# Patient Record
Sex: MALE | Race: WHITE | Employment: UNEMPLOYED | URBAN - METROPOLITAN AREA
[De-identification: names, ages, dates, MRNs, and addresses within clinical notes are randomized per-mention and may not be internally consistent; named-entity substitution may affect disease eponyms.]

---

## 2018-11-20 ENCOUNTER — NURSE ONLY (OUTPATIENT)
Dept: CARDIOLOGY CLINIC | Age: 83
End: 2018-11-20
Payer: COMMERCIAL

## 2018-11-20 ENCOUNTER — OFFICE VISIT (OUTPATIENT)
Dept: CARDIOLOGY CLINIC | Age: 83
End: 2018-11-20
Payer: COMMERCIAL

## 2018-11-20 VITALS
SYSTOLIC BLOOD PRESSURE: 100 MMHG | DIASTOLIC BLOOD PRESSURE: 60 MMHG | HEART RATE: 60 BPM | HEIGHT: 67 IN | WEIGHT: 205 LBS | BODY MASS INDEX: 32.18 KG/M2

## 2018-11-20 DIAGNOSIS — N18.9 CHRONIC KIDNEY DISEASE, UNSPECIFIED CKD STAGE: ICD-10-CM

## 2018-11-20 DIAGNOSIS — I48.20 CHRONIC A-FIB (HCC): ICD-10-CM

## 2018-11-20 DIAGNOSIS — Z95.0 PACEMAKER: Primary | ICD-10-CM

## 2018-11-20 DIAGNOSIS — I25.10 CORONARY ARTERY DISEASE INVOLVING NATIVE CORONARY ARTERY OF NATIVE HEART WITHOUT ANGINA PECTORIS: Primary | ICD-10-CM

## 2018-11-20 DIAGNOSIS — I44.7 LBBB (LEFT BUNDLE BRANCH BLOCK): ICD-10-CM

## 2018-11-20 DIAGNOSIS — I73.9 PVD (PERIPHERAL VASCULAR DISEASE) WITH CLAUDICATION (HCC): ICD-10-CM

## 2018-11-20 DIAGNOSIS — Z95.0 STATUS POST PLACEMENT OF CARDIAC PACEMAKER: ICD-10-CM

## 2018-11-20 PROCEDURE — 93000 ELECTROCARDIOGRAM COMPLETE: CPT | Performed by: INTERNAL MEDICINE

## 2018-11-20 PROCEDURE — 93279 PRGRMG DEV EVAL PM/LDLS PM: CPT | Performed by: INTERNAL MEDICINE

## 2018-11-20 PROCEDURE — 99214 OFFICE O/P EST MOD 30 MIN: CPT | Performed by: INTERNAL MEDICINE

## 2019-05-14 ENCOUNTER — NURSE ONLY (OUTPATIENT)
Dept: CARDIOLOGY CLINIC | Age: 84
End: 2019-05-14

## 2019-05-14 ENCOUNTER — OFFICE VISIT (OUTPATIENT)
Dept: CARDIOLOGY CLINIC | Age: 84
End: 2019-05-14

## 2019-05-14 VITALS
HEART RATE: 64 BPM | SYSTOLIC BLOOD PRESSURE: 100 MMHG | BODY MASS INDEX: 32.33 KG/M2 | DIASTOLIC BLOOD PRESSURE: 60 MMHG | WEIGHT: 206 LBS | HEIGHT: 67 IN

## 2019-05-14 DIAGNOSIS — I73.9 PVD (PERIPHERAL VASCULAR DISEASE) WITH CLAUDICATION (HCC): ICD-10-CM

## 2019-05-14 DIAGNOSIS — I48.20 CHRONIC ATRIAL FIBRILLATION (HCC): ICD-10-CM

## 2019-05-14 DIAGNOSIS — I25.10 CORONARY ARTERY DISEASE INVOLVING NATIVE CORONARY ARTERY OF NATIVE HEART WITHOUT ANGINA PECTORIS: Primary | ICD-10-CM

## 2019-05-14 DIAGNOSIS — I44.7 LBBB (LEFT BUNDLE BRANCH BLOCK): ICD-10-CM

## 2019-05-14 DIAGNOSIS — Z95.0 PACEMAKER: Primary | ICD-10-CM

## 2019-05-14 DIAGNOSIS — Z95.0 STATUS POST PLACEMENT OF CARDIAC PACEMAKER: ICD-10-CM

## 2019-05-14 PROCEDURE — 93279 PRGRMG DEV EVAL PM/LDLS PM: CPT | Performed by: INTERNAL MEDICINE

## 2019-05-14 PROCEDURE — 99214 OFFICE O/P EST MOD 30 MIN: CPT | Performed by: INTERNAL MEDICINE

## 2019-05-14 NOTE — PROGRESS NOTES
History:   Diagnosis Date    Arthritis     both knees    Atrial fibrillation (HCC)     tachy-braden syndrome with more than 4 second pauses     CAD (coronary artery disease)     Chronic kidney disease     Diabetes mellitus ([facility]     diagnosed in 2002    Gout     H/O cardiovascular stress test 8/1/11    Adenosine: Nontransmural MI involving inferior wall of LV extending into inferoseptal wall and small portion of apical lateral wall with possibly small area of residual stress-induced ischemia involving a very small portion of lapical lateral wall. Gated views showed paradoxical septal motion, hypokinesis of IVS, dyskinesis of small portion of apical septum, preserved global LVSF, EF 51%     H/O Doppler echocardiogram 8/15/08    Normal LV size with borderline concentric LVH, regional wall motion abnormality, EF 45%, evidence of diastolic dysfunction, mildly dilated left atrium, mildly sclerotic valve without stenosis or insufficiency, mod posterior mitral annular calcifications without mitral stenosis, trace MR.  Pacemaker lead noted in right side of heart    Hyperlipidemia     LBBB (left bundle branch block)     MI (myocardial infarction) ([facility] 4/26/98    Non-Q wave     Pacemaker at end of battery life 07/2016    PVD (peripheral vascular disease) ([facility]     Arterial ultrasound of left lower extremity reported likely occluded left posterior tibial artery     SOB (shortness of breath) on exertion             Past Surgical History:   Procedure Laterality Date    ARTERIAL ANEURYSM REPAIR  6/98    S/P right femoral pseudoaneurysm repair status post cardiac cath   [facility] CARDIAC PACEMAKER PLACEMENT      CATARACT REMOVAL Bilateral 7/08    COLONOSCOPY  1987    diverticulosis    CYSTOSCOPY  1992    renal calculi    DIAGNOSTIC CARDIAC CATH LAB PROCEDURE  10/30/97    [address]: CAD    DIAGNOSTIC CARDIAC CATH LAB PROCEDURE  4/28/98    [address]: Cardiac cath, balloon angioplasty and stent deployment to prox LAD, balloon angioplasty distal left circumflex. Moderate to severe LV systolic dysfunction,  EF 35-40%     DIAGNOSTIC CARDIAC CATH LAB PROCEDURE  4/19/99    [address]: Mildly dilated LV. Small area of anterolateral hypokinesis. EF 40-50% apical LAD disease. Prox LAD site of stent widely patent. Subtotally occluded middle 3rd of left CX (cuplrit vessel). Subtotally occluded 2nd OM branch. Diffuse up to 40-50% mid RCA disease and up to 40-50% distal RCA disease. Occluded PDA of RCA.  Successful balloon angioplasty and stent deploy to middle 3rd of left CX     JOINT REPLACEMENT Left     knee    JOINT REPLACEMENT Right 1998, and again 3/14/05    knee   Arvil Fitch PACEMAKER PLACEMENT  07/26/2016    [place] K172 SR (#[id number]) Leads from 05/21/2007 model 4136 #[id number]    PTCA  10/31/97    to LAD and RCA          Family History   Problem Relation Age of Onset    Diabetes Mother     Stroke Father     Diabetes Sister     Heart Surgery Brother     Heart Disease Brother     Diabetes Brother     Heart Failure Sister     Heart Disease Brother           Social History     Tobacco Use    Smoking status: Never Smoker    Smokeless tobacco: Never Used   Substance Use Topics    Alcohol use: No     Comment: occasional coffee or pop         Review of Systems:  HEENT: negative for acute visual symptoms or auditory problems, no dysphagia  Constitutional: negative for fever and chills, or significant weight loss  Respiratory: negative for cough, wheezing, or hemoptysis  Cardiovascular: negative for chest pain, palpitations, and dyspnea  Gastrointestinal: negative for abdominal pain, diarrhea, nausea and vomiting  Endocrine: Negative for polyuria and polydyspsia  Genitourinary:negative for dysuria and hematuria  Derm: negative for rash and skin lesion(s)  Neurological: negative for tingling, numbness, weakness, seizures and tremors  Endocrine: negative for polydipsia and polyuria  Musculoskeletal: negative for pain or tenderness  Psychiatric: negative for anxiety, depression, or suicidal ideations         O:  COMPLETE PHYSICAL EXAM:       /60   Pulse 64   Ht 5' 7\" (1.702 m)   Wt 206 lb (93.4 kg)   BMI 32.26 kg/m²       General:   Patient alert, comfortable, no distress. Appears stated age. HEENT:    Pupils equal, no icterus, no nasal drainage, tongue moist.   Neck:              No masses, Thyroid not palpable. Chest:   Normal configuration, non tender. Lungs:   Clear to auscultation bilaterally, few scattered rhonchi. Cardiovascular:  Regular rhythm, 1/6 systolic murmur, No S3, PMI at 5th ICS, no palpable thrills, No elevated JVD, No carotid bruit. Abdomen:  Soft, Non tender, Bowel sounds normal, no pulsatile abdominal aorta, no palpable masses. Extremities:  + edema. Distal pulses palpable. No cyanosis, no clubbing. Skin:   Good turgor, warm and dry, no cyanosis. Musculoskeletal: No joint swelling or deformity. Neuro:   Cranial nerves grossly intact; No focal neurologic deficit. Psych:   Alert, good mood and effect. REVIEW OF DIAGNOSTIC TESTS:        Electrocardiogram: Reviewed       Pacemaker:  Interrogated, OK           A/P:   ASSESSMENT / PLAN:    Pam Ambriz was seen today for coronary artery disease. Diagnoses and all orders for this visit:       Coronary artery disease involving native coronary artery of native heart without angina pectoris; s/p [address] [place] [address];  On BB, ARB, Statins, No ASA due to Twyla Negus  [facility] 12 Lead     Chronic atrial fibrillation [facility]): Resume renal dose Xarelto 15mg daily  -     EKG 12 Lead     Status post placement of cardiac pacemaker: VVI- 5/2007; s/p PGR 7/2016-[employer]; normal Fn     LBBB (left bundle branch block)     Chronic kidney disease, unspecified CKD stage     PAD (peripheral artery disease) ([facility] [address] stable     Chronic kidney disease, unspecified CKD stage     Preventive Cardiology: Low cholesterol diet, regular exercise as tolerate, and gradual weight loss discussed. Monitor BP and heart rates. D/w his wife   All questions answered about cardiac diagnoses and cardiac medications. Continue current medications. Compliance with medications and f/u with all physicians discussed. Risk factor modification based on risk profile discussed. Call if any exertional chest pain, short of breath, dizzy or palpitations   Follow up in 12 months or earlier if needed.          [facility]  [address], [place], [address]  [phone]

## 2019-10-08 ENCOUNTER — TELEPHONE (OUTPATIENT)
Dept: ADMINISTRATIVE | Age: 84
End: 2019-10-08

## 2019-11-22 ENCOUNTER — NURSE ONLY (OUTPATIENT)
Dept: CARDIOLOGY CLINIC | Age: 84
End: 2019-11-22
Payer: MEDICARE

## 2019-11-22 DIAGNOSIS — I48.20 CHRONIC A-FIB (HCC): ICD-10-CM

## 2019-11-22 DIAGNOSIS — Z95.0 PACEMAKER: Primary | ICD-10-CM

## 2019-11-22 PROCEDURE — 93294 REM INTERROG EVL PM/LDLS PM: CPT | Performed by: INTERNAL MEDICINE

## 2019-11-22 PROCEDURE — 93296 REM INTERROG EVL PM/IDS: CPT | Performed by: INTERNAL MEDICINE

## 2020-06-15 ENCOUNTER — TELEPHONE (OUTPATIENT)
Dept: CARDIOLOGY CLINIC | Age: 85
End: 2020-06-15

## 2020-06-23 ENCOUNTER — TELEPHONE (OUTPATIENT)
Dept: ADMINISTRATIVE | Age: 85
End: 2020-06-23

## 2020-06-23 NOTE — TELEPHONE ENCOUNTER
Pt called in, received letter to schedule appt w/ Dr Salvatore Leonardo.   Please advise pt at [phone]

## 2020-08-28 ENCOUNTER — OFFICE VISIT (OUTPATIENT)
Dept: CARDIOLOGY CLINIC | Age: 85
End: 2020-08-28
Payer: MEDICARE

## 2020-08-28 ENCOUNTER — NURSE ONLY (OUTPATIENT)
Dept: CARDIOLOGY CLINIC | Age: 85
End: 2020-08-28
Payer: MEDICARE

## 2020-08-28 VITALS
SYSTOLIC BLOOD PRESSURE: 90 MMHG | DIASTOLIC BLOOD PRESSURE: 58 MMHG | BODY MASS INDEX: 29.82 KG/M2 | RESPIRATION RATE: 16 BRPM | HEIGHT: 67 IN | WEIGHT: 190 LBS | HEART RATE: 64 BPM

## 2020-08-28 PROCEDURE — 93279 PRGRMG DEV EVAL PM/LDLS PM: CPT | Performed by: INTERNAL MEDICINE

## 2020-08-28 PROCEDURE — 93000 ELECTROCARDIOGRAM COMPLETE: CPT | Performed by: INTERNAL MEDICINE

## 2020-08-28 PROCEDURE — 99213 OFFICE O/P EST LOW 20 MIN: CPT | Performed by: INTERNAL MEDICINE

## 2020-08-28 RX ORDER — ACETAMINOPHEN 160 MG
TABLET,DISINTEGRATING ORAL DAILY
COMMUNITY

## 2020-08-28 NOTE — PROGRESS NOTES
polydipsia and polyuria  Musculoskeletal: negative for pain or tenderness  Psychiatric: negative for anxiety, depression, or suicidal ideations         O:  COMPLETE PHYSICAL EXAM:       BP (!) 90/58   Pulse 64   Resp 16   Ht 5' 7\" (1.702 m)   Wt 190 lb (86.2 kg)   BMI 29.76 kg/m²       General:   Patient alert, comfortable, no distress. Appears stated age. HEENT:    Pupils equal, no icterus, no nasal drainage, tongue moist.   Neck:              No masses, Thyroid not palpable. No elevated JVD, No carotid bruit. Chest:   Normal configuration, non tender. Lungs:   Clear to auscultation bilaterally, few scattered rhonchi. Cardiovascular:  Regular rhythm, 1/6 systolic murmur, No S3, PMI at 5th ICS, no palpable thrills,    Abdomen:  Soft, Non tender, Bowel sounds normal, no pulsatile abdominal aorta, no palpable masses. Extremities:  No edema. Distal pulses palpable. No cyanosis, no clubbing. Skin:   Good turgor, warm and dry, no cyanosis. Musculoskeletal: No joint swelling or deformity. Neuro:   Cranial nerves grossly intact; No focal neurologic deficit. Psych:   Alert, good mood and effect. REVIEW OF DIAGNOSTIC TESTS:        Electrocardiogram: A fib, Ventricular paced      Pacemaker:  Interrogated, OK             A/P:   ASSESSMENT / PLAN:    Avis Mcardle was seen today for coronary artery disease. Diagnoses and all orders for this visit:      Coronary artery disease involving native coronary artery of native heart without angina pectoris; s/p [address] [place] [address];  On BB, ARB, Statin, No ASA due to [facility] 12 Lead     Chronic atrial fibrillation (HCC): On Xarelto, adjust for renal Fn  -     EKG 12 Lead     Status post placement of cardiac pacemaker: VVI- 5/2007; s/p PGR 7/2016-[employer]; normal Fn     LBBB (left bundle branch block)     PAD (peripheral artery disease) ([facility] [address] stable     Chronic kidney disease, unspecified CKD stage     Preventive Cardiology: Low cholesterol diet, regular exercise as tolerate discussed. Monitor BP and heart rates. Above recommendations discussed with him and his wife   All questions answered about cardiac diagnoses and cardiac medications. Continue current medications. Compliance with medications and f/u with all physicians discussed. Risk factor modification based on risk profile discussed. Call if any exertional chest pain, short of breath, dizzy or palpitations   Follow up in 6 months or earlier if needed.          [facility] Card[facility]  [address], [place], [facility]  [phone]

## 2021-03-05 ENCOUNTER — NURSE ONLY (OUTPATIENT)
Dept: CARDIOLOGY CLINIC | Age: 86
End: 2021-03-05
Payer: MEDICARE

## 2021-03-05 ENCOUNTER — OFFICE VISIT (OUTPATIENT)
Dept: CARDIOLOGY CLINIC | Age: 86
End: 2021-03-05
Payer: MEDICARE

## 2021-03-05 VITALS
SYSTOLIC BLOOD PRESSURE: 98 MMHG | HEART RATE: 60 BPM | WEIGHT: 190 LBS | HEIGHT: 67 IN | BODY MASS INDEX: 29.82 KG/M2 | DIASTOLIC BLOOD PRESSURE: 64 MMHG

## 2021-03-05 DIAGNOSIS — I73.9 PAD (PERIPHERAL ARTERY DISEASE) (HCC): ICD-10-CM

## 2021-03-05 DIAGNOSIS — Z95.0 STATUS POST PLACEMENT OF CARDIAC PACEMAKER: ICD-10-CM

## 2021-03-05 DIAGNOSIS — I48.20 CHRONIC A-FIB (HCC): Primary | ICD-10-CM

## 2021-03-05 DIAGNOSIS — I25.10 CORONARY ARTERY DISEASE INVOLVING NATIVE CORONARY ARTERY OF NATIVE HEART WITHOUT ANGINA PECTORIS: ICD-10-CM

## 2021-03-05 DIAGNOSIS — Z95.0 PACEMAKER: ICD-10-CM

## 2021-03-05 DIAGNOSIS — I44.7 LBBB (LEFT BUNDLE BRANCH BLOCK): ICD-10-CM

## 2021-03-05 PROCEDURE — 99214 OFFICE O/P EST MOD 30 MIN: CPT | Performed by: INTERNAL MEDICINE

## 2021-03-05 PROCEDURE — 93279 PRGRMG DEV EVAL PM/LDLS PM: CPT | Performed by: INTERNAL MEDICINE

## 2021-03-05 PROCEDURE — 93000 ELECTROCARDIOGRAM COMPLETE: CPT | Performed by: INTERNAL MEDICINE

## 2021-03-05 NOTE — PROGRESS NOTES
OFFICE VISIT     PRIMARY CARE PHYSICIAN:      Zay Rascon MD       ALLERGIES / SENSITIVITIES:      No Known Allergies       REVIEWED MEDICATIONS:        Current Outpatient Medications:     Cholecalciferol (VITAMIN D3) 50 MCG (2000 UT) CAPS, Take by mouth daily, Disp: , Rfl:     insulin glargine (LANTUS SOLOSTAR) 100 UNIT/ML injection, Inject into the skin 2 times daily 44 units am and 24 units pm, Disp: , Rfl:     rivaroxaban (XARELTO) 15 MG TABS tablet, Take 1 tablet by mouth daily, Disp: 30 tablet, Rfl: 9    donepezil (ARICEPT) 5 MG tablet, Take 5 mg by mouth nightly, Disp: , Rfl:     torsemide (DEMADEX) 10 MG tablet, Take 1 tablet by mouth every morning (Patient taking differently: Take 100 mg by mouth every morning ), Disp: 30 tablet, Rfl: 1    insulin aspart (NOVOLOG) 100 UNIT/ML injection vial, Inject into the skin 3 times daily (before meals) 5 units before meals, Disp: , Rfl:     metoprolol (LOPRESSOR) 25 MG tablet, Take 25 mg by mouth 2 times daily Take am dos, 07/26., Disp: , Rfl:     atorvastatin (LIPITOR) 80 MG tablet, Take 40 mg by mouth daily , Disp: , Rfl:     losartan (COZAAR) 100 MG tablet, Take 100 mg by mouth daily. , Disp: , Rfl:     amLODIPine (NORVASC) 5 MG tablet, Take 5 mg by mouth daily Take am dos, 07/26., Disp: , Rfl:     nitroGLYCERIN (NITROSTAT) 0.4 MG SL tablet, Place 0.4 mg under the tongue every 5 minutes as needed Last use 2 years ago, Disp: , Rfl:     allopurinol (ZYLOPRIM) 100 MG tablet, Take 100 mg by mouth daily. , Disp: , Rfl:       S: REASON FOR VISIT:       Chief Complaint   Patient presents with    Atrial Fibrillation     6 month ov  Patient denies any cp sob or dizziness.      Coronary Artery Disease          History of Present Illness:       Office Visit for follow up of CAD, A fib, pacer   80 yr old Margo Rodriguez with history of PAF, CAD, Pacemaker came for f/u visit   No hospitalizations or surgeries since last visit   Patient is compliant with all medications   Deejay any exertional chest pain or short of breath   No palpitations, dizzy or syncope. Active at home   No orthopnea   Try to watch diet          Past Medical History:   Diagnosis Date    Arthritis     both knees    Atrial fibrillation (HCC)     tachy-braden syndrome with more than 4 second pauses     CAD (coronary artery disease)     Chronic kidney disease     Diabetes mellitus ([facility] [address]     diagnosed in 2002    Gout     H/O cardiovascular stress test 8/1/11    Adenosine: Nontransmural MI involving inferior wall of LV extending into inferoseptal wall and small portion of apical lateral wall with possibly small area of residual stress-induced ischemia involving a very small portion of lapical lateral wall. Gated views showed paradoxical septal motion, hypokinesis of IVS, dyskinesis of small portion of apical septum, preserved global LVSF, EF 51%     H/O Doppler echocardiogram 8/15/08    Normal LV size with borderline concentric LVH, regional wall motion abnormality, EF 45%, evidence of diastolic dysfunction, mildly dilated left atrium, mildly sclerotic valve without stenosis or insufficiency, mod posterior mitral annular calcifications without mitral stenosis, trace MR.  Pacemaker lead noted in right side of heart    Hyperlipidemia     LBBB (left bundle branch block)     MI (myocardial infarction) ([address] 4/26/98    Non-Q wave     Pacemaker at end of battery life 07/2016    PVD (peripheral vascular disease) ([facility] [address]     Arterial ultrasound of left lower extremity reported likely occluded left posterior tibial artery     SOB (shortness of breath) on exertion             Past Surgical History:   Procedure Laterality Date    ARTERIAL ANEURYSM REPAIR  6/98    S/P right femoral pseudoaneurysm repair status post cardiac cath   Margaret Fay CARDIAC PACEMAKER PLACEMENT      CATARACT REMOVAL Bilateral 7/08    COLONOSCOPY  1987    diverticulosis    CYSTOSCOPY  1992    renal calculi    DIAGNOSTIC CARDIAC CATH LAB PROCEDURE  10/30/97    [address]: CAD    DIAGNOSTIC CARDIAC CATH LAB PROCEDURE  4/28/98    [address]: Cardiac cath, balloon angioplasty and stent deployment to prox LAD, balloon angioplasty distal left circumflex. Moderate to severe LV systolic dysfunction,  EF 35-40%     DIAGNOSTIC CARDIAC CATH LAB PROCEDURE  4/19/99    [address]: Mildly dilated LV. Small area of anterolateral hypokinesis. EF 40-50% apical LAD disease. Prox LAD site of stent widely patent. Subtotally occluded middle 3rd of left CX (cuplrit vessel). Subtotally occluded 2nd OM branch. Diffuse up to 40-50% mid RCA disease and up to 40-50% distal RCA disease. Occluded PDA of RCA.  Successful balloon angioplasty and stent deploy to middle 3rd of left CX     JOINT REPLACEMENT Left     knee    JOINT REPLACEMENT Right 1998, and again 3/14/05    knee   Leslie Shiv PACEMAKER PLACEMENT  07/26/2016    [place] K172 SR (#[id number]) Leads from 05/21/2007 model 4136 #[id number]    PTCA  10/31/97    to LAD and RCA          Family History   Problem Relation Age of Onset    Diabetes Mother     Stroke Father     Diabetes Sister     Heart Surgery Brother     Heart Disease Brother     Diabetes Brother     Heart Failure Sister     Heart Disease Brother           Social History     Tobacco Use    Smoking status: Never Smoker    Smokeless tobacco: Never Used   Substance Use Topics    Alcohol use: No     Comment: occasional coffee or pop         Review of Systems:  Constitutional: negative for fever and chills, or significant weight loss  HEENT: negative for acute visual symptoms or auditory problems, no dysphagia  Respiratory: negative for cough, wheezing, or hemoptysis  Cardiovascular: negative for chest pain, palpitations, and dyspnea  Gastrointestinal: negative for abdominal pain, diarrhea, nausea and vomiting  Endocrine: Negative for polyuria and polydyspsia  Genitourinary:negative for dysuria and hematuria  Derm: negative for rash and skin lesion(s)  Neurological: negative for tingling, numbness, weakness, seizures and tremors  Endocrine: negative for polydipsia and polyuria  Musculoskeletal: negative for pain or tenderness  Psychiatric: negative for anxiety, depression, or suicidal ideations         O:  COMPLETE PHYSICAL EXAM:       BP 98/64 (Site: Left Upper Arm, Position: Sitting, Cuff Size: Medium Adult)   Pulse 60   Ht 5' 7\" (1.702 m)   Wt 190 lb (86.2 kg)   BMI 29.76 kg/m²       General:   Patient alert, comfortable, no distress. Appears stated age. HEENT:    Pupils equal, no icterus, no nasal drainage, tongue moist.   Neck:              No masses, Thyroid not palpable. No elevated JVD, No carotid bruit. Chest:   Normal configuration, non tender. Lungs:   Clear to auscultation bilaterally, few scattered rhonchi. Cardiovascular:  Regular rhythm, 1/6 systolic murmur, No S3,  no palpable thrills,    Abdomen:  Soft, Non tender, Bowel sounds normal   Extremities:  No edema. Distal pulses palpable. No cyanosis, no clubbing. Skin:   Good turgor, warm and dry, no cyanosis. Musculoskeletal: No joint swelling or deformity. Neuro:   Cranial nerves grossly intact; No focal neurologic deficit. Psych:   Alert, good mood and effect. REVIEW OF DIAGNOSTIC TESTS:        Electrocardiogram: Reviewed      Pacemaker:  Interrogated, OK            A/P:   ASSESSMENT / PLAN:    Carli Tejeda was seen today for atrial fibrillation and coronary artery disease. Diagnoses and all orders for this visit:     Coronary artery disease involving native coronary artery of native heart without angina pectoris; s/p [address] [place] [address];  On BB, ARB, Statin, No ASA due to Stanton Walters  [facility] 12 Lead     Permanent Atrial fibrillation (HCC): On Xarelto, adjust for renal Fn  -     EKG 12 Lead     Status post placement of cardiac pacemaker: VVI- 5/2007; s/p PGR [email or web address]; normal Fn     LBBB (left bundle branch block) - Chronic     PAD (peripheral artery disease) ([facility] [address] stable    Preventive Cardiology: Low cholesterol diet, regular exercise as tolerate, and gradual weight loss discussed. Monitor BP and heart rates. Above recommendations discussed with him and his son   All questions answered about cardiac diagnoses and cardiac medications. Continue current medications. Compliance with medications and f/u with all physicians discussed. Risk factor modification based on risk profile discussed. Call if any exertional chest pain, short of breath, dizzy or palpitations   Follow up in 6 months or earlier if needed.          [facility] Card[facility]  [address] [facility], [address] [place] [address]  [phone]